# Patient Record
(demographics unavailable — no encounter records)

---

## 2024-11-08 NOTE — HISTORY OF PRESENT ILLNESS
[FreeTextEntry1] : HISTORY:  49 y/o female w/ Hx of celiac disease, idiopathic urticaria, mast cell disorder presents as follow up for idiopathic urticaria.  Patient previously followed with Dr. Reeves who is retiring and pt needs a new rheumatologist. Dr. Reeves was mainly watching for any signs of connective tissue diseases.  Pt has had recurrent idiopathic urticaria treated with multiple antihistamine medications.  Patient has mild diffuse pains of joints as well as muscles and stiffness including arms, legs, neck, lower back without swelling. Reports gelling.  Pt has idiopathic urticaria. Pt has been taking Xolair, Xyzal, cimetidine, allegra with continuing urticaria. BMB reportedly normal. ID evaluation with only EBV+. Skin biopsy reportedly negative for urticarial vasculitis. Pt was evaluated by [facility] Allergist and discussed low-dose cyclosporine but declined by patient.  Pt is currently working with  and acupuncturist with using castor oil with resolution with urticaria.  Pt was evaluated by GI for elevated LFTs and positive mitochondrial Ab. Liver biopsy was reportedly normal.  Pt follows with hematology and has had axillary lymph node biopsy for enlargement which was reportedly negative. Mild neutropenia deemed to be medication induced.  Raynaud's with fingertips and toes turning red or blue with cold temperature. Reports fatigue.  Denies oral ulcers, hair loss.   Pt has idiopathic urticaria with positive THEE and low C4.  THEE is a marker that can be seen more likely in systemic autoimmune diseases, but it is not specific. Pt has known mitochondrial Ab (with negative liver workup) which may be a cause of THEE positivity. Low complements can also be seen in allergic diseases.  At this time, any evaluation for systemic autoimmune diseases would be based on any new symptoms or lab findings. Otherwise, patient should mainly be treated for idiopathic urticaria with her allergist. Pt's joint pains are not inflammatory in nature, but workup can be considered if there is any concerns for inflammatory arthritis in specific joints.   INTERVAL HISTORY:  Pt here for 6 months follow up. Pt has been having return of hives recently and working with naturopath for treatment. Pt is having fatigue, sinus congestion along with the hives. Denies any new symptoms including significant joint pains/swelling, new rashes, or any other concerning symptoms. Pt recently had labwork by hem/onc with THEE+ (known) with rest of THEE subserologies negative as before.  WORKUP:  Remarkable for (4/2024): THEE 1:320 speckled, Mitochondrial Ab 172.9, C4 10  Normal/neg (4/2024): CMP, CBC (WBC 4.54), UPCR, ESR, dsDNA, SSA, SSB, [place], RNP, C3, sm-Ab (1:20 in past), CPK, uric acid (4.5)  Last Echo (4/2023): Essentially normal

## 2024-11-08 NOTE — PHYSICAL EXAM
[TextEntry] : GENERAL: Appears in no acute distress HEENT: EOMI, PERRLA. No conjunctival erythema. Moist mucous membranes. No nasopharyngeal ulcers NECK: Supple, no cervical lymphadenopathy, no thyromegaly CARDIOVASCULAR: RRR. S1, S2 auscultated. No murmurs or rubs. PULMONARY: Clear to auscultation b/l, no wheezes, rales, or crackles ABDOMINAL: Soft, nontender, nondistended. Bowel sounds present. No organomegaly. MSK: No active synovitis, swelling, erythema, or warmth. No deformities. SKIN: No lesions or rashes NEURO: No focal deficits. PSYCH: AAOx3. Normal affect and thought process.

## 2024-11-08 NOTE — ASSESSMENT
[FreeTextEntry1] : 51 y/o female w/ Hx of celiac disease, idiopathic urticaria, mast cell disorder presents as follow up for idiopathic urticaria.  Patient previously followed with Dr. Reeves who is retiring and pt needs a new rheumatologist. Dr. Reeves was mainly watching for any signs of connective tissue diseases.  Pt has had recurrent idiopathic urticaria treated with multiple antihistamine medications.  Patient has mild diffuse pains of joints as well as muscles and stiffness including arms, legs, neck, lower back without swelling. Reports gelling.  Pt has idiopathic urticaria. Pt has been taking Xolair, Xyzal, cimetidine, allegra with continuing urticaria. BMB reportedly normal. ID evaluation with only EBV+. Skin biopsy reportedly negative for urticarial vasculitis. Pt was evaluated by [facility] Allergist and discussed low-dose cyclosporine but declined by patient.  Pt is currently working with  and acupuncturist with using castor oil with resolution with urticaria.  Pt was evaluated by GI for elevated LFTs and positive mitochondrial Ab. Liver biopsy was reportedly normal.  Pt follows with hematology and has had axillary lymph node biopsy for enlargement which was reportedly negative. Mild neutropenia deemed to be medication induced.  Raynaud's with fingertips and toes turning red or blue with cold temperature. Reports fatigue.  Denies oral ulcers, hair loss.   Pt has idiopathic urticaria with positive THEE and low C4.  THEE is a marker that can be seen more likely in systemic autoimmune diseases, but it is not specific. Pt has known mitochondrial Ab (with negative liver workup) which may be a cause of THEE positivity. Low complements can also be seen in allergic diseases.  At this time, any evaluation for systemic autoimmune diseases would be based on any new symptoms or lab findings. Otherwise, patient should mainly be treated for idiopathic urticaria with her allergist. Pt's joint pains are not inflammatory in nature, but workup can be considered if there is any concerns for inflammatory arthritis in specific joints.   Pt has been having return of hives recently and working with naturopath for treatment. Pt is having fatigue, sinus congestion along with the hives. Denies any new symptoms including significant joint pains/swelling, new rashes, or any other concerning symptoms. Pt recently had labwork by hem/onc with THEE+ (known) with rest of THEE subserologies negative as before.  - Pt had recent autoimmune labwork by Dr. Reeves in 4/2024 and with hem/onc 10/2024. No need for labwork today.  - Continue follow up with allergist  - Pt to contact me for any symptoms concerning for evidence of an autoimmune disease to discuss any workup needed  - RTC 1 year or earlier PRN for follow up.

## 2025-02-18 NOTE — ASSESSMENT
[FreeTextEntry1] : Adrianna is a 51 yr old female, here for follow up  of thyroid nodule  per patient she had thyroid US in 5/24 showed 2 small nodules in left lobe.  she had to get US before this appointment but she forgot.  TFTs normal in 5/24.  Thyroid nodules: I have discussed with the patient the incidence of thyroid nodules (fairly high) and the incidence of thyroid cancer (fairly low). We also discussed what can be the worrisome features of malignant nodule and increased incidence of thyroid cancer in patients .  We discussed with patient that per PATRICIO  guidelines  FNA is not recommended in sub centimeter nodules in low risk patient like herself risk of malignancy is very low. She forgot to get US , will get US this visit, orders provided again. In case of any sig. change will then consider FNA. will get TFTs.  RTC in 1 yr or sooner.

## 2025-02-18 NOTE — HISTORY OF PRESENT ILLNESS
[FreeTextEntry1] : Adrianna is a 51  yr old female, who is here for follow up  of thyroid nodule  per patient she had thyroid US in 5/24, by her PCP,  showed 2 small nodules in left lobe. Here for same. she had to get US before this appointment but she forgot.   She lost 20 pounds by doing anti inflammatory diet, cut out sugar, gluten.Has celiac disease. Seeing naturopath. She says she has had hives and urticaria all her life, energy is not that great, not new. No family h/o thyroid  cancer.  Daughter has hashimoto's. No h/o neck radiation.  No dysphagia, no SOB. Denies heat or cold intolerance, hands and feet get cold at times,  no constipation or diarrhea,  no palpitations, no skin or hair changes. Menstrual cycles stopped few years ago.

## 2025-02-18 NOTE — REVIEW OF SYSTEMS
[Fatigue] : fatigue [Decreased Appetite] : appetite not decreased [Recent Weight Gain (___ Lbs)] : no recent weight gain [Recent Weight Loss (___ Lbs)] : no recent weight loss [Visual Field Defect] : no visual field defect [Dysphagia] : no dysphagia [Chest Pain] : no chest pain [Palpitations] : no palpitations [Shortness Of Breath] : no shortness of breath [Cough] : no cough [Nausea] : no nausea [Constipation] : no constipation [Vomiting] : no vomiting [Diarrhea] : no diarrhea [Polyuria] : no polyuria [Joint Pain] : no joint pain [Muscle Weakness] : no muscle weakness [Acanthosis] : no acanthosis  [Acne] : no acne [Dry Skin] : no dry skin [Headaches] : no headaches [Dizziness] : no dizziness [Tremors] : no tremors [Pain/Numbness of Digits] : no pain/numbness of digits [Insomnia] : no insomnia [Cold Intolerance] : no cold intolerance [Heat Intolerance] : no heat intolerance

## 2025-02-18 NOTE — PHYSICAL EXAM
[Alert] : alert [Well Nourished] : well nourished [No Acute Distress] : no acute distress [Normal Sclera/Conjunctiva] : normal sclera/conjunctiva [No Neck Mass] : no neck mass was observed [No LAD] : no lymphadenopathy [Thyroid Not Enlarged] : the thyroid was not enlarged [No Respiratory Distress] : no respiratory distress [No Accessory Muscle Use] : no accessory muscle use [Clear to Auscultation] : lungs were clear to auscultation bilaterally [Normal S1, S2] : normal S1 and S2 [Normal Rate] : heart rate was normal [Regular Rhythm] : with a regular rhythm [No Stigmata of Cushings Syndrome] : no stigmata of Cushings Syndrome [Normal Gait] : normal gait [No Rash] : no rash [No Tremors] : no tremors [Oriented x3] : oriented to person, place, and time [Acanthosis Nigricans] : no acanthosis nigricans

## 2025-05-04 NOTE — ASSESSMENT
[FreeTextEntry1] : She is feeling much better with herbal medications.  She will follow-up with me in 3 to 4 months.  At that time we will evaluate her with additional blood testing including LFTs to ensure that they are normal.  May need ultrasound abdominal last CT abdomen had revealed no significant pathologies except for subcentimeter hypodensities in the liver.   Jean-Claude Pierce MD Gastroenterology

## 2025-05-04 NOTE — ASSESSMENT
[FreeTextEntry1] : She is feeling much better with herbal medications.  She will follow-up with me in 3 to 4 months.  At that time we will evaluate her with additional blood testing including LFTs to ensure that they are normal.  May need ultrasound abdominal last CT abdomen had revealed no significant pathologies except for subcentimeter hypodensities in the liver.   Jean-Claude Pierce MD Gastroenterology   Yes

## 2025-05-04 NOTE — HISTORY OF PRESENT ILLNESS
[FreeTextEntry1] : Patient has arrived for a follow-up.  She has been evaluated in the past with a colonoscopy which has been unimpressive.  She also has history of celiac disease and has been gluten-free.  She has been complaining of generalized body aches and also had a rash.  She has lately been drinking Cerasee tea and has improved significantly.  She has also been taking dandelion products as well.

## 2025-07-30 NOTE — PHYSICAL EXAM
[Alert] : alert [Normal Voice/Communication] : normal voice/communication [Healthy Appearing] : healthy appearing [No Acute Distress] : no acute distress [Sclera] : the sclera and conjunctiva were normal [Hearing Threshold Finger Rub Not [place]] : hearing was normal [Normal Lips/Gums] : the lips and gums were normal [Oropharynx] : the oropharynx was normal [Normal Appearance] : the appearance of the neck was normal [No Neck Mass] : no neck mass was observed [No Respiratory Distress] : no respiratory distress [No Acc Muscle Use] : no accessory muscle use [] : no hepatosplenomegaly [Oriented To Time, Place, And Person] : oriented to person, place, and time

## 2025-07-30 NOTE — PHYSICAL EXAM
[Alert] : alert [Normal Voice/Communication] : normal voice/communication [Healthy Appearing] : healthy appearing [No Acute Distress] : no acute distress [Sclera] : the sclera and conjunctiva were normal [Hearing Threshold Finger Rub Not [place]] : hearing was normal [Normal Lips/Gums] : the lips and gums were normal [Oropharynx] : the oropharynx was normal [Normal Appearance] : the appearance of the neck was normal [No Neck Mass] : no neck mass was observed [No Respiratory Distress] : no respiratory distress [No Acc Muscle Use] : no accessory muscle use [] : no hepatosplenomegaly [Oriented To Time, Place, And Person] : oriented to person, place, and time

## 2025-07-30 NOTE — HISTORY OF PRESENT ILLNESS
[FreeTextEntry1] : Colonoscopy was done in February 2022 which showed good prep, internal hemorrhoids, otherwise normal and recommended repeat in 10 years.

## 2025-07-30 NOTE — ASSESSMENT
[FreeTextEntry1] : Patient is a 51 year old female, with PMH of idiopathic urticaria, celiac disease on strict gluten free diet, who presents for follow up visit.   Continue teas as she has been doing, significant relief in her idiopathic urticaria with current regimen Colonoscopy in 2022 reviewed with pt   H/O celiac disease, offered EGD but pt deferred at this time since she has been gluten free

## 2025-07-30 NOTE — ADDENDUM
[FreeTextEntry1] : I, Pam Corrigan PA-C, acted as a scribe for the services dictated to me by FRANCISCO Zimmer in this document on Jul 30, 2025 for FRANK MACIAS  I have personally seen and examined the patient. I agree with the history, physical examination, assessment and recommendations as noted above.  Francisco Pierce MD Gastroenterology